# Patient Record
Sex: MALE | Race: WHITE | NOT HISPANIC OR LATINO | Employment: OTHER | URBAN - METROPOLITAN AREA
[De-identification: names, ages, dates, MRNs, and addresses within clinical notes are randomized per-mention and may not be internally consistent; named-entity substitution may affect disease eponyms.]

---

## 2018-03-26 LAB
ANISOCYTOSIS (HISTORICAL): ABNORMAL
BASOPHILS # BLD AUTO: 0 X3/UL (ref 0–0.3)
BASOPHILS # BLD AUTO: 0.5 % (ref 0–2)
DEPRECATED RDW RBC AUTO: 20.6 % (ref 11.5–14.5)
EOSINOPHIL # BLD AUTO: 0.1 X3/UL (ref 0–0.5)
EOSINOPHIL NFR BLD AUTO: 3.2 % (ref 0–5)
HCT VFR BLD AUTO: 21.9 % (ref 42–52)
HGB BLD-MCNC: 7.3 G/DL (ref 14–18)
LYMPHOCYTES # BLD AUTO: 0.4 X3/UL (ref 1.2–4.2)
LYMPHOCYTES NFR BLD AUTO: 8.3 % (ref 20.5–51.1)
MCH RBC QN AUTO: 33.5 PG (ref 26–34)
MCHC RBC AUTO-ENTMCNC: 33.1 G/DL (ref 31–36)
MCV RBC AUTO: 101.2 FL (ref 81–99)
MONOCYTES # BLD AUTO: 0.3 X3/UL (ref 0–1)
MONOCYTES NFR BLD AUTO: 5.5 % (ref 1.7–12)
NEUTROPHILS # BLD AUTO: 3.9 X3/UL (ref 1.4–6.5)
NEUTS SEG NFR BLD AUTO: 82.5 % (ref 42.2–75.2)
OVALOCYTOSIS (HISTORICAL): SLIGHT
PLATELET # BLD AUTO: 198 X3/UL (ref 130–400)
PLATELET ESTIMATE (HISTORICAL): NORMAL
PMV BLD AUTO: 9.6 FL (ref 8.6–11.7)
POLYCHROMASIA (HISTORICAL): SLIGHT
RBC # BLD AUTO: 2.17 X6/UL (ref 4.3–5.9)
RBC MORPHOLOGY (HISTORICAL): ABNORMAL
TEAR DROP CELLS (HISTORICAL): SLIGHT
WBC # BLD AUTO: 4.7 X3/UL (ref 4.8–10.8)

## 2018-03-27 LAB
ABO/RH(D) (HISTORICAL): NORMAL
BLD GP AB SCN SERPL QL: NEGATIVE

## 2018-03-29 LAB
HCT VFR BLD AUTO: 33.1 % (ref 42–52)
HGB BLD-MCNC: 10.8 G/DL (ref 14–18)

## 2018-03-30 LAB — UNIT #1 (HISTORICAL): NORMAL

## 2018-07-27 ENCOUNTER — HOSPITAL ENCOUNTER (OUTPATIENT)
Dept: SURGERY | Facility: HOSPITAL | Age: 83
Discharge: HOME | End: 2018-07-27
Payer: MEDICARE

## 2018-07-27 VITALS
BODY MASS INDEX: 25.33 KG/M2 | OXYGEN SATURATION: 98 % | DIASTOLIC BLOOD PRESSURE: 59 MMHG | WEIGHT: 152 LBS | HEART RATE: 90 BPM | TEMPERATURE: 99.1 F | RESPIRATION RATE: 18 BRPM | SYSTOLIC BLOOD PRESSURE: 108 MMHG | HEIGHT: 65 IN

## 2018-07-27 LAB
ABO GROUP BLD: NORMAL
BLD GP AB SCN SERPL QL: NEGATIVE
RH BLD: POSITIVE
SPECIMEN EXPIRATION DATE: NORMAL

## 2018-07-27 PROCEDURE — 86920 COMPATIBILITY TEST SPIN: CPT

## 2018-07-27 PROCEDURE — 36430 TRANSFUSION BLD/BLD COMPNT: CPT

## 2018-07-27 PROCEDURE — P9021 RED BLOOD CELLS UNIT: HCPCS

## 2018-07-27 PROCEDURE — 86850 RBC ANTIBODY SCREEN: CPT | Performed by: DENTIST

## 2018-07-27 PROCEDURE — 86900 BLOOD TYPING SEROLOGIC ABO: CPT | Performed by: DENTIST

## 2018-07-27 PROCEDURE — 86901 BLOOD TYPING SEROLOGIC RH(D): CPT | Performed by: DENTIST

## 2018-07-27 RX ORDER — LORATADINE 10 MG/1
10 TABLET ORAL DAILY
COMMUNITY

## 2018-07-27 RX ORDER — OMEPRAZOLE 20 MG/1
20 CAPSULE, DELAYED RELEASE ORAL DAILY
COMMUNITY
End: 2018-10-02 | Stop reason: ALTCHOICE

## 2018-07-27 RX ORDER — CITALOPRAM 20 MG/1
20 TABLET ORAL DAILY
COMMUNITY

## 2018-07-27 RX ORDER — METOPROLOL SUCCINATE 25 MG/1
25 TABLET, EXTENDED RELEASE ORAL DAILY
COMMUNITY

## 2018-07-27 RX ORDER — TERAZOSIN 1 MG/1
CAPSULE ORAL
COMMUNITY

## 2018-07-27 RX ORDER — OXYCODONE HYDROCHLORIDE AND ACETAMINOPHEN 5; 325 MG/1; MG/1
1 TABLET ORAL EVERY 4 HOURS PRN
COMMUNITY

## 2018-07-27 RX ORDER — LOPERAMIDE HYDROCHLORIDE 2 MG/1
2 CAPSULE ORAL 4 TIMES DAILY PRN
COMMUNITY

## 2018-07-27 RX ORDER — FERROUS SULFATE 220 (44)/5
220 ELIXIR ORAL DAILY
COMMUNITY

## 2018-07-27 RX ORDER — ALLOPURINOL 100 MG/1
100 TABLET ORAL DAILY
COMMUNITY

## 2018-07-27 RX ORDER — DONEPEZIL HYDROCHLORIDE 10 MG/1
10 TABLET, FILM COATED ORAL
COMMUNITY

## 2018-07-27 RX ORDER — FOLIC ACID 1 MG/1
TABLET ORAL DAILY
COMMUNITY

## 2018-07-27 RX ORDER — ACETAMINOPHEN 325 MG/1
650 TABLET ORAL EVERY 6 HOURS PRN
COMMUNITY

## 2018-07-27 RX ORDER — UBIDECARENONE 75 MG
100 CAPSULE ORAL DAILY
COMMUNITY

## 2018-07-27 RX ORDER — FUROSEMIDE 20 MG/1
20 TABLET ORAL 2 TIMES DAILY
COMMUNITY

## 2018-07-27 RX ORDER — ALPRAZOLAM 0.25 MG/1
0.25 TABLET ORAL
COMMUNITY

## 2018-07-27 RX ORDER — MIRTAZAPINE 15 MG/1
15 TABLET, FILM COATED ORAL
COMMUNITY

## 2018-07-27 RX ORDER — ACETAMINOPHEN 325 MG/1
650 TABLET ORAL ONCE
Status: COMPLETED | OUTPATIENT
Start: 2018-07-27 | End: 2018-07-27

## 2018-07-27 RX ADMIN — ACETAMINOPHEN 650 MG: 325 TABLET ORAL at 09:48

## 2018-10-02 ENCOUNTER — HOSPITAL ENCOUNTER (OUTPATIENT)
Dept: SURGERY | Facility: HOSPITAL | Age: 83
Discharge: HOME | End: 2018-10-02
Payer: MEDICARE

## 2018-10-02 VITALS
OXYGEN SATURATION: 96 % | WEIGHT: 152 LBS | BODY MASS INDEX: 25.33 KG/M2 | DIASTOLIC BLOOD PRESSURE: 60 MMHG | SYSTOLIC BLOOD PRESSURE: 111 MMHG | HEIGHT: 65 IN | HEART RATE: 83 BPM | TEMPERATURE: 98.5 F | RESPIRATION RATE: 20 BRPM

## 2018-10-02 PROCEDURE — 36430 TRANSFUSION BLD/BLD COMPNT: CPT

## 2018-10-02 PROCEDURE — 86900 BLOOD TYPING SEROLOGIC ABO: CPT | Performed by: DENTIST

## 2018-10-02 PROCEDURE — 86850 RBC ANTIBODY SCREEN: CPT | Performed by: DENTIST

## 2018-10-02 PROCEDURE — 86901 BLOOD TYPING SEROLOGIC RH(D): CPT | Performed by: DENTIST

## 2018-10-02 PROCEDURE — 86920 COMPATIBILITY TEST SPIN: CPT

## 2018-10-02 PROCEDURE — P9021 RED BLOOD CELLS UNIT: HCPCS

## 2018-10-02 NOTE — PERIOPERATIVE NURSING NOTE
1215 - Upon entering the room found #20 IV pulled out  Infusion stopped  Two additional attempts to restart IV wihtout success  Anesethia called for IV placement    Arielle Quintana RN

## 2018-10-02 NOTE — PERIOPERATIVE NURSING NOTE
Pt arrived by ambulance  Multiple dressings noted  Spoke with nursing facility they state patient skin tears very easily    Rafi Mesa RN

## 2018-10-10 ENCOUNTER — HOSPITAL ENCOUNTER (EMERGENCY)
Facility: HOSPITAL | Age: 83
Discharge: HOME | End: 2018-10-10
Payer: MEDICARE

## 2018-10-10 ENCOUNTER — OFFICE VISIT (OUTPATIENT)
Dept: CARDIOLOGY CLINIC | Facility: CLINIC | Age: 83
End: 2018-10-10
Payer: MEDICARE

## 2018-10-10 VITALS
RESPIRATION RATE: 18 BRPM | DIASTOLIC BLOOD PRESSURE: 71 MMHG | HEIGHT: 65 IN | HEART RATE: 80 BPM | OXYGEN SATURATION: 97 % | TEMPERATURE: 97.6 F | BODY MASS INDEX: 25.31 KG/M2 | SYSTOLIC BLOOD PRESSURE: 136 MMHG | WEIGHT: 151.9 LBS

## 2018-10-10 VITALS
HEIGHT: 65 IN | SYSTOLIC BLOOD PRESSURE: 112 MMHG | WEIGHT: 152 LBS | DIASTOLIC BLOOD PRESSURE: 66 MMHG | HEART RATE: 80 BPM | BODY MASS INDEX: 25.33 KG/M2

## 2018-10-10 DIAGNOSIS — I48.21 PERMANENT ATRIAL FIBRILLATION (HCC): ICD-10-CM

## 2018-10-10 DIAGNOSIS — Z46.59 ENCOUNTER FOR FEEDING TUBE PLACEMENT: Primary | ICD-10-CM

## 2018-10-10 DIAGNOSIS — Z01.810 PRE-OPERATIVE CARDIOVASCULAR EXAMINATION: Primary | ICD-10-CM

## 2018-10-10 DIAGNOSIS — I73.9 PVD (PERIPHERAL VASCULAR DISEASE) (HCC): ICD-10-CM

## 2018-10-10 DIAGNOSIS — I10 ESSENTIAL HYPERTENSION: ICD-10-CM

## 2018-10-10 PROBLEM — R60.0 LOCALIZED EDEMA: Status: ACTIVE | Noted: 2018-10-10

## 2018-10-10 PROCEDURE — 99283 EMERGENCY DEPT VISIT LOW MDM: CPT

## 2018-10-10 PROCEDURE — 93000 ELECTROCARDIOGRAM COMPLETE: CPT | Performed by: PHYSICIAN ASSISTANT

## 2018-10-10 PROCEDURE — 99203 OFFICE O/P NEW LOW 30 MIN: CPT | Performed by: PHYSICIAN ASSISTANT

## 2018-10-10 NOTE — ED PROVIDER NOTES
History  Chief Complaint   Patient presents with    Feeding Tube Problem     Per nursing home staff PEG tube is leaking from Y site since yesterday      Samara Ga is a 59-year-old from a local nursing home was brought to the emergency department by ambulance crew due to a leaking PEG to as reported by the nursing staff today  Patient denies abdominal pain or vomiting  Patient was brought to the emergency department for replacement of the defective feeding tube  History provided by:  Patient, nursing home and EMS personnel   used: No    Feeding Tube Problem   Location:  Epigastric area  Quality:  Leaking tube  Severity:  Severe  Onset quality:  Sudden  Duration: Today  Timing:  Constant  Progression:  Worsening  Chronicity:  New  Associated symptoms: no abdominal pain, no chest pain, no congestion, no cough, no diarrhea, no ear pain, no fatigue, no fever, no headaches, no loss of consciousness, no myalgias, no nausea, no rash, no rhinorrhea, no shortness of breath, no sore throat, no vomiting and no wheezing        Prior to Admission Medications   Prescriptions Last Dose Informant Patient Reported? Taking?    ALPRAZolam (XANAX) 0 25 mg tablet   Yes No   Sig: Take 0 25 mg by mouth daily at bedtime as needed for anxiety   acetaminophen (TYLENOL) 325 mg tablet   Yes No   Sig: Take 650 mg by mouth every 6 (six) hours as needed for mild pain   allopurinol (ZYLOPRIM) 100 mg tablet   Yes No   Sig: Take 100 mg by mouth daily   bisacodyl (DULCOLAX) 5 mg EC tablet   Yes No   Sig: Take 10 mg by mouth daily as needed for constipation   citalopram (CeleXA) 20 mg tablet   Yes No   Sig: Take 20 mg by mouth daily   cyanocobalamin (VITAMIN B-12) 100 mcg tablet   Yes No   Sig: Take 100 mcg by mouth daily   donepezil (ARICEPT) 10 mg tablet   Yes No   Sig: Take 10 mg by mouth daily at bedtime   ferrous sulfate 220 (44 Fe) mg/5 mL solution   Yes No   Sig: Take 220 mg by mouth daily   folic acid (FOLVITE) 1 mg tablet   Yes No   Sig: Take by mouth daily   furosemide (LASIX) 20 mg tablet   Yes No   Sig: Take 20 mg by mouth 2 (two) times a day   loperamide (IMODIUM) 2 mg capsule   Yes No   Sig: Take 2 mg by mouth 4 (four) times a day as needed for diarrhea   loratadine (CLARITIN) 10 mg tablet   Yes No   Sig: Take 10 mg by mouth daily   magnesium hydroxide (MILK OF MAGNESIA) 400 mg/5 mL oral suspension   Yes No   Sig: Take 15 mL by mouth daily as needed for constipation   metoprolol succinate (TOPROL-XL) 25 mg 24 hr tablet   Yes No   Sig: Take 25 mg by mouth daily   mirtazapine (REMERON) 15 mg tablet   Yes No   Sig: Take 15 mg by mouth daily at bedtime   oxyCODONE-acetaminophen (PERCOCET) 5-325 mg per tablet   Yes No   Sig: Take 1 tablet by mouth every 4 (four) hours as needed for moderate pain   terazosin (HYTRIN) 1 mg capsule   Yes No   Sig: Take by mouth daily at bedtime      Facility-Administered Medications: None       Past Medical History:   Diagnosis Date    Anemia     Anxiety     Basal cell carcinoma     CHF (congestive heart failure) (HCC)     Chronic kidney disease     Chronic pain disorder     Cirrhosis (HCC)     COPD (chronic obstructive pulmonary disease) (HCC)     Coronary artery disease     Dementia     Depression     Diabetes mellitus (HCC)     Dysphagia     GERD (gastroesophageal reflux disease)     Gout     Heart failure (HCC)     History of transfusion     Hypertension     Irregular heart beat     Liver disease     [facility]oma [facility] [address] [facility]     Osteomyelitis ([facility] [facility]     Pneumonia     Rhinitis     Thrombocytopenia (HCC)     Ulcer duodenal hemorrhage        Past Surgical History:   Procedure Laterality Date    CATARACT EXTRACTION      FOOT SURGERY      HERNIA REPAIR      KNEE ARTHROSCOPY      PEG TUBE PLACEMENT N/A 2015    SKIN BIOPSY      facial skin cancer    TONSILECTOMY AND ADNOIDECTOMY      TONSILLECTOMY         History reviewed  No pertinent family history    I have reviewed and agree with the history as documented  Social History   Substance Use Topics    Smoking status: Former Smoker    Smokeless tobacco: Never Used    Alcohol use No        Review of Systems   Constitutional: Negative for fatigue and fever  HENT: Negative for congestion, ear pain, rhinorrhea and sore throat  Eyes: Negative  Respiratory: Negative for cough, shortness of breath and wheezing  Cardiovascular: Negative for chest pain  Gastrointestinal: Negative for abdominal pain, diarrhea, nausea and vomiting  Endocrine: Negative  Genitourinary: Negative  Musculoskeletal: Negative for myalgias  Skin: Negative for rash  Allergic/Immunologic: Negative  Neurological: Negative for loss of consciousness and headaches  Hematological: Negative  Psychiatric/Behavioral: Negative  Physical Exam  Physical Exam   Constitutional: He is oriented to person, place, and time  He appears well-developed and well-nourished  No distress  HENT:   Head: Normocephalic and atraumatic  Right Ear: External ear normal    Left Ear: External ear normal    Nose: Nose normal    Mouth/Throat: Oropharynx is clear and moist  No oropharyngeal exudate  Eyes: Pupils are equal, round, and reactive to light  Conjunctivae and EOM are normal  Right eye exhibits no discharge  Left eye exhibits no discharge  No scleral icterus  Neck: Normal range of motion  Neck supple  No tracheal deviation present  No thyromegaly present  Cardiovascular: Normal rate, regular rhythm, normal heart sounds and intact distal pulses  Pulmonary/Chest: Effort normal and breath sounds normal  No respiratory distress  Abdominal: Soft  Bowel sounds are normal  He exhibits no distension  There is no tenderness  Feeding tube, size F20 is in place on the epigastric area  Musculoskeletal: Normal range of motion  He exhibits no edema, tenderness or deformity  Lymphadenopathy:     He has no cervical adenopathy  Neurological: He is alert and oriented to person, place, and time  No cranial nerve deficit or sensory deficit  He exhibits normal muscle tone  Coordination normal    Skin: Skin is warm and dry  No rash noted  He is not diaphoretic  No erythema  No pallor  Psychiatric: He has a normal mood and affect  His behavior is normal  Judgment and thought content normal    Nursing note and vitals reviewed  Vital Signs  ED Triage Vitals [10/10/18 0909]   Temperature Pulse Respirations Blood Pressure SpO2   97 6 °F (36 4 °C) 80 18 136/71 96 %      Temp Source Heart Rate Source Patient Position - Orthostatic VS BP Location FiO2 (%)   Temporal Monitor Lying Left arm --      Pain Score       No Pain           Vitals:    10/10/18 0909   BP: 136/71   Pulse: 80   Patient Position - Orthostatic VS: Lying       Visual Acuity      ED Medications  Medications - No data to display    Diagnostic Studies  Results Reviewed     None                 No orders to display              Procedures  Feeding Tube  Date/Time: 10/10/2018 9:50 AM  Performed by: GIOVANY Rebolledo  Authorized by: Sheldon Talbot     Patient location:  Bedside  Other Assisting Provider: No    Consent:     Consent obtained:  Verbal and emergent situation    Consent given by:  Patient    Risks discussed:  Bleeding, infection and pain  Universal protocol:     Procedure explained and questions answered to patient or proxy's satisfaction: yes      Relevant documents present and verified: yes      Site/side marked: yes      Immediately prior to procedure, a time out was called: yes      Patient identity confirmed:  Verbally with patient, hospital-assigned identification number, arm band and anonymous protocol, patient vented/unresponsive  Pre-procedure details: Old tube type:  Gastrostomy    Old tube size: 20Fr  Indications:     Indications: tube cracked    Anesthesia (see MAR for exact dosages):      Anesthesia method:  None  Procedure details:     Patient position: Supine    Procedure type:  Replacement    Tube type:  Gastrostomy    Tube size: 20 Fr  Bulb inflation volume:  20 ml    Bulb inflation fluid:  Normal saline  Post-procedure details:     Placement/position confirmation:  Auscultation and insufflation of air    Placement difficulty:  None    Bleeding:  Minimal    Patient tolerance of procedure: Tolerated well, no immediate complications           Phone Contacts  ED Phone Contact    ED Course                               MDM  Number of Diagnoses or Management Options  Encounter for feeding tube placement: minor  Risk of Complications, Morbidity, and/or Mortality  Presenting problems: minimal  Diagnostic procedures: minimal  Management options: minimal    Patient Progress  Patient progress: stable    CritCare Time    Disposition  Final diagnoses:   Encounter for feeding tube placement     Time reflects when diagnosis was documented in both MDM as applicable and the Disposition within this note     Time User Action Codes Description Comment    10/10/2018  9:53 AM Isidra Smith Add [Z78 9] Encounter for feeding tube placement       ED Disposition     ED Disposition Condition Comment    Discharge  Kacey Lund discharge to Nursing home  Condition at discharge: Good        Follow-up Information     Follow up With Specialties Details Why Cleo [address], DO Family Medicine In 2 days  [address] [address] [facility]  [phone]            Patient's Medications   Discharge Prescriptions    No medications on file     No discharge procedures on file      ED Provider  Electronically Signed by           Gabriele De La O MD  10/10/18 9608

## 2018-10-10 NOTE — PROGRESS NOTES
[facility]   Outpatient Note  Vinnie Patton  3/12/1928  [id number]  [facility]  [address] [facility] [address] [phone]    Subjective:   Vinnie Patton is a 80 y o  male    The patient is referred to the office for pre-operative cardiac clearance for angiogram and eventual angioplasty of the RLE for PVD  He is complaining of foot pain  He has a know history of HTN HLD and PVD  He has no history of CAD that he is aware of  On examination he has irregular rhythm and a murmur and EKG shows Atrial fibrillation  He is on Eliquis  He has no chest pain or exertional symptoms, but he is wheelchair bound  PMH/PSH  Reviewd  History   Smoking Status    Former Smoker   Smokeless Tobacco    Never Used   ,   History   Alcohol Use No   ,   History   Drug Use No     No family history on file        Current Outpatient Prescriptions:     acetaminophen (TYLENOL) 325 mg tablet, Take 650 mg by mouth every 6 (six) hours as needed for mild pain, Disp: , Rfl:     allopurinol (ZYLOPRIM) 100 mg tablet, Take 100 mg by mouth daily, Disp: , Rfl:     ALPRAZolam (XANAX) 0 25 mg tablet, Take 0 25 mg by mouth daily at bedtime as needed for anxiety, Disp: , Rfl:     apixaban (ELIQUIS) 2 5 mg, 5 mg Every 12 hours  , Disp: , Rfl:     citalopram (CeleXA) 20 mg tablet, Take 20 mg by mouth daily, Disp: , Rfl:     donepezil (ARICEPT) 10 mg tablet, Take 10 mg by mouth daily at bedtime, Disp: , Rfl:     ferrous sulfate 220 (44 Fe) mg/5 mL solution, Take 220 mg by mouth daily, Disp: , Rfl:     folic acid (FOLVITE) 1 mg tablet, Take by mouth daily, Disp: , Rfl:     furosemide (LASIX) 20 mg tablet, Take 20 mg by mouth 2 (two) times a day, Disp: , Rfl:     loperamide (IMODIUM) 2 mg capsule, Take 2 mg by mouth 4 (four) times a day as needed for diarrhea, Disp: , Rfl:     loratadine (CLARITIN) 10 mg tablet, Take 10 mg by mouth daily, Disp: , Rfl:     magnesium hydroxide (MILK OF MAGNESIA) 400 mg/5 mL oral suspension, Take 15 mL by mouth daily as needed for constipation, Disp: , Rfl:     metoprolol succinate (TOPROL-XL) 25 mg 24 hr tablet, Take 25 mg by mouth daily, Disp: , Rfl:     mirtazapine (REMERON) 15 mg tablet, Take 15 mg by mouth daily at bedtime, Disp: , Rfl:     oxyCODONE-acetaminophen (PERCOCET) 5-325 mg per tablet, Take 1 tablet by mouth every 4 (four) hours as needed for moderate pain, Disp: , Rfl:     terazosin (HYTRIN) 1 mg capsule, Take by mouth daily at bedtime, Disp: , Rfl:     bisacodyl (DULCOLAX) 5 mg EC tablet, Take 10 mg by mouth daily as needed for constipation, Disp: , Rfl:     cyanocobalamin (VITAMIN B-12) 100 mcg tablet, Take 100 mcg by mouth daily, Disp: , Rfl:   Allergies   Allergen Reactions    Adhesive [Medical Tape]     Vancomycin Abdominal Pain       Review of Systems   Constitution: Negative  HENT: Negative  Eyes: Negative  Cardiovascular: Negative  Negative for chest pain, claudication, cyanosis, dyspnea on exertion, irregular heartbeat, leg swelling, near-syncope, orthopnea, palpitations, paroxysmal nocturnal dyspnea and syncope  Respiratory: Negative  Negative for cough, hemoptysis, shortness of breath, sleep disturbances due to breathing, snoring, sputum production and wheezing  Endocrine: Negative  Hematologic/Lymphatic: Negative  Skin: Negative  Musculoskeletal: Negative  Foot pain R   Gastrointestinal: Negative  Genitourinary: Negative  Neurological: Negative  Psychiatric/Behavioral: Negative  Allergic/Immunologic: Negative  Objective:   /66   Pulse 80   Ht 5' 5" (1 651 m)   Wt 68 9 kg (152 lb) Comment: per patient  BMI 25 29 kg/m²   Physical Exam   Constitutional: He is oriented to person, place, and time  He appears well-developed  He appears cachectic    frail   HENT:   Head: Normocephalic and atraumatic     Eyes: Conjunctivae are normal  No scleral icterus  Neck: Normal range of motion  Neck supple  No JVD present  No thyromegaly present  Cardiovascular: Normal rate  An irregularly irregular rhythm present  Exam reveals decreased pulses (Pedal )  Exam reveals no gallop and no friction rub  Murmur heard  Blowing systolic murmur is present  at the apex  Pulmonary/Chest: No respiratory distress  He has no wheezes  He has no rales  Abdominal: Soft  Bowel sounds are normal  He exhibits no distension  There is no tenderness  Musculoskeletal: Normal range of motion  He exhibits no edema, tenderness or deformity  Neurological: He is alert and oriented to person, place, and time  Psychiatric: He has a normal mood and affect  His behavior is normal    Nursing note and vitals reviewed  Lab Review:   reviewed    Recent Cardiac Testing:   Most recent echo 2014: will repeat     ECG Review:   Atrial fibrillation controlled ventricular response PVCs, NS ST TW changes    Assessment:     Problem List Items Addressed This Visit     Essential hypertension    Relevant Orders    Echo complete with contrast if indicated    PVD (peripheral vascular disease) ([address] )    Relevant Orders    Echo complete with contrast if indicated    Permanent atrial fibrillation ([address] )    Relevant Orders    Echo complete with contrast if indicated      Other Visit Diagnoses     Pre-operative cardiovascular examination    -  Primary    Relevant Orders    POCT ECG (Completed)    Echo complete with contrast if indicated         Plan:   No changes in medication  Echo will be checked  If there is no WMA, Severe LVD or severe MR, I would see no reason why he would not be able to move forward with the procedure as planned  Eliquis should be held two days prior to the procedure and resumed the day after  We would be happy to follow with the patient 3 months post op

## 2018-10-10 NOTE — DISCHARGE INSTRUCTIONS
Tube Feeding   WHAT YOU SHOULD KNOW:   Tube feeding provides your body with nutrients when you are not able to eat or cannot absorb nutrition from the food you eat  Tube feeding contains water, protein, sugar, fats, vitamins, minerals, and electrolytes  You may need tube feeding for several days or weeks  Tube feeding can be given through a tube placed in your nose or mouth and into your stomach  Tube feeding can also be given through a percutaneous endoscopic gastronomy (PEG) tube placed directly into your stomach through your abdomen  Ask for more information on a PEG tube  INSTRUCTIONS:   Risks of EN:   · Nasal or throat discomfort or dry mouth     · Nausea, diarrhea, abdominal cramping     · Gastric reflux, which can cause vomiting and aspiration pneumonia (pneumonia caused by presence of liquid or food in your lungs)     · Blocked or misplaced feeding tube, which can cause aspiration pneumonia     · Infection at the site of the PEG tube  After you leave the hospital:  If you need tube feeding after you leave the hospital, a healthcare provider will teach you or someone close to you how to give tube feeding formula at home  You will learn how to use the equipment and care for your tube  If you have a PEG tube, you will learn how to protect the skin at the insertion site and change the dressing  Your healthcare provider will make sure the correct supplies are delivered to your home  Caregivers will visit you regularly to monitor your health while you are getting tube feeding  Self-care at home with EN:   · Follow up with your healthcare provider as directed  You will need regular blood and urine tests to check for infection or other problems  · Sit up during feeding to avoid reflux and aspiration (movement of tube feeding into your lungs)  Remain sitting for 1 hour after your feeding is complete  · Keep track of how much tube feeding you take in and how much you urinate   Write down any changes in bowel movements  Weigh yourself as directed by your healthcare provider  · Care for your feeding tube as directed  Flush your tube with warm water before and after feedings to prevent blockages  · If you have a PEG tube, clean the skin around the insertion site as directed by your healthcare provider  Check your skin for signs of infection, such as redness, swelling, tenderness, warmth, or drainage  · Continue regular mouth care  Use mouthwash 3 to 4 times a day to keep your mouth moist and prevent infection  Contact your healthcare provider if:   · You have discomfort or pain around your PEG tube site  · You have nausea, diarrhea, or abdominal bloating or discomfort  · You weigh less than your healthcare provider says you should  · You have questions or concerns about your condition or care  Return to the emergency department if:   · You start coughing or vomiting during or after a feeding  · You have severe abdominal pain  · The skin around the PEG tube site is red, swollen, tender, or warm  · You have increased pain during feeding or when your PEG tube is flushed  · Blood or tube feeding fluid leaks from the PEG tube site  · Your PEG tube comes out  © 2014 [address] [employer] is for End User's use only and may not be sold, redistributed or otherwise used for commercial purposes  All illustrations and images included in CareNotes® are the copyrighted property of [employer] , [employer]  or Benjamín Anna  The above information is an  only  It is not intended as medical advice for individual conditions or treatments  Talk to your doctor, nurse or pharmacist before following any medical regimen to see if it is safe and effective for you  Tube Feeding   WHAT YOU NEED TO KNOW:   What is tube feeding? Tube feeding provides your body with nutrients when you are not able to eat or cannot absorb nutrition from the food you eat   Your tube feeding may be temporary or permanent  Tube feeding can be given through a tube placed in your nose and down into your stomach  Tube feeding can also be given through a tube placed through your abdomen directly into your stomach or intestines  What are the types of tube feeding? · Continuous  tube feeding is a steady amount of formula being given over a long time  It can be stopped so that medicines or water can be given through your feeding tube  You may need to stop the feeding to check if you are digesting the formula properly  Continuous tube feedings are usually given using a feeding pump  A feeding pump can regulate the speed and amount of tube feeding that is given  · Intermittent  tube feeding is also known as bolus feeding  A large amount of formula is given over a short time  The feeding may be given at the same times you would eat breakfast, lunch, and dinner  It may be given only while you sleep  You and your healthcare provider will make a plan that fits into your daily schedule  A feeding pump may be used for feedings  You may be taught how to use a syringe or a free flow bag for feedings  What will happen before I leave the hospital?  You may need to continue tube feedings at home  A healthcare provider will teach you or someone close to you how to give tube feeding formula at home  You will learn how to use the equipment and care for your tube  You will learn how to find and fix problems with your tube feeding  A dietitian will decide what type of formula is best for you  What else do I need to know about tube feeding? · Always wash your hands before touching your feeding tube, formula, or medicine  This lowers the risk of infection  · Make sure your tube feeding is connected to the correct port on the feeding tube  · The tube feeding formula should be at room temperature before your feeding   Formula that is too cold or too hot can cause discomfort or destroy nutrients in the formula  · Care for your feeding tube as directed  Flush your tube with warm water before and after feedings to prevent blockages  · Sit up during your feeding to avoid reflux and aspiration (movement of tube feeding into your lungs)  Remain sitting for 1 hour after your feeding is complete  · Keep track of how much tube feeding you take in and how much you urinate  Write down any changes in bowel movements  Weigh yourself as directed by your healthcare provider  · Continue regular mouth care  Use mouthwash 3 to 4 times a day to keep your mouth moist and prevent infection  · Follow up with your healthcare provider as directed  You will need regular blood and urine tests to check for infection or other problems  CARE AGREEMENT:   You have the right to help plan your care  Learn about your health condition and how it may be treated  Discuss treatment options with your caregivers to decide what care you want to receive  You always have the right to refuse treatment  The above information is an  only  It is not intended as medical advice for individual conditions or treatments  Talk to your doctor, nurse or pharmacist before following any medical regimen to see if it is safe and effective for you  © 2017 [address] [employer]  Information is for End User's use only and may not be sold, redistributed or otherwise used for commercial purposes  All illustrations and images included in CareNotes® are the copyrighted property of A D A M [employer]  or Benjamín Anna

## 2019-02-20 ENCOUNTER — HOSPITAL ENCOUNTER (OUTPATIENT)
Dept: SURGERY | Facility: HOSPITAL | Age: 84
Discharge: HOME | End: 2019-02-20
Payer: MEDICARE

## 2019-02-20 VITALS
TEMPERATURE: 98.1 F | OXYGEN SATURATION: 97 % | SYSTOLIC BLOOD PRESSURE: 118 MMHG | RESPIRATION RATE: 16 BRPM | DIASTOLIC BLOOD PRESSURE: 66 MMHG | HEART RATE: 102 BPM

## 2019-02-20 PROCEDURE — 86850 RBC ANTIBODY SCREEN: CPT | Performed by: PHYSICIAN ASSISTANT

## 2019-02-20 PROCEDURE — 36430 TRANSFUSION BLD/BLD COMPNT: CPT

## 2019-02-20 PROCEDURE — P9021 RED BLOOD CELLS UNIT: HCPCS

## 2019-02-20 PROCEDURE — P9016 RBC LEUKOCYTES REDUCED: HCPCS

## 2019-02-20 PROCEDURE — 86920 COMPATIBILITY TEST SPIN: CPT

## 2019-02-20 PROCEDURE — 86900 BLOOD TYPING SEROLOGIC ABO: CPT | Performed by: PHYSICIAN ASSISTANT

## 2019-02-20 PROCEDURE — 86901 BLOOD TYPING SEROLOGIC RH(D): CPT | Performed by: PHYSICIAN ASSISTANT

## 2019-02-20 RX ORDER — BISACODYL 10 MG
10 SUPPOSITORY, RECTAL RECTAL AS NEEDED
COMMUNITY

## 2019-02-20 NOTE — PROGRESS NOTES
Blackened scab left posterior neck behind left ear  Mid posterior neck reddened  1215- one unit of packed cells infused without incident  Patient tolerated honey thickened liquids  Voiding hourly 100-150 cc kevin urine  Peg tube clamoed and site clean and dry

## 2019-05-17 ENCOUNTER — TRANSCRIBE ORDERS (OUTPATIENT)
Dept: ADMINISTRATIVE | Facility: HOSPITAL | Age: 84
End: 2019-05-17

## 2019-05-17 DIAGNOSIS — I99.8 ISCHEMIA OF RIGHT LOWER EXTREMITY: ICD-10-CM

## 2019-05-17 DIAGNOSIS — L97.319 LOWER LIMB ULCER, ANKLE, RIGHT, WITH UNSPECIFIED SEVERITY (HCC): Primary | ICD-10-CM

## 2019-05-22 ENCOUNTER — TRANSCRIBE ORDERS (OUTPATIENT)
Dept: ADMINISTRATIVE | Facility: HOSPITAL | Age: 84
End: 2019-05-22

## 2019-05-22 DIAGNOSIS — R91.8 ABNORMAL CT SCAN, LUNG: Primary | ICD-10-CM

## 2019-05-31 ENCOUNTER — HOSPITAL ENCOUNTER (OUTPATIENT)
Dept: CT IMAGING | Facility: HOSPITAL | Age: 84
Discharge: HOME | End: 2019-05-31
Attending: SURGERY
Payer: COMMERCIAL

## 2019-05-31 ENCOUNTER — HOSPITAL ENCOUNTER (OUTPATIENT)
Dept: CT IMAGING | Facility: HOSPITAL | Age: 84
Discharge: HOME | End: 2019-05-31
Payer: COMMERCIAL

## 2019-05-31 DIAGNOSIS — I99.8 ISCHEMIA OF RIGHT LOWER EXTREMITY: ICD-10-CM

## 2019-05-31 DIAGNOSIS — L97.319 LOWER LIMB ULCER, ANKLE, RIGHT, WITH UNSPECIFIED SEVERITY (HCC): ICD-10-CM

## 2019-05-31 DIAGNOSIS — R91.8 ABNORMAL CT SCAN, LUNG: ICD-10-CM

## 2019-05-31 PROCEDURE — 71250 CT THORAX DX C-: CPT

## 2019-05-31 PROCEDURE — 75635 CT ANGIO ABDOMINAL ARTERIES: CPT

## 2019-05-31 RX ADMIN — IOHEXOL 120 ML: 350 INJECTION, SOLUTION INTRAVENOUS at 08:20
